# Patient Record
Sex: FEMALE | Race: WHITE | Employment: OTHER | URBAN - METROPOLITAN AREA
[De-identification: names, ages, dates, MRNs, and addresses within clinical notes are randomized per-mention and may not be internally consistent; named-entity substitution may affect disease eponyms.]

---

## 2022-09-29 ENCOUNTER — HOSPITAL ENCOUNTER (OUTPATIENT)
Age: 77
Discharge: HOME | End: 2022-09-29
Payer: MEDICARE

## 2022-09-29 ENCOUNTER — HOSPITAL ENCOUNTER (OUTPATIENT)
Dept: MRI IMAGING | Age: 77
Discharge: HOME | End: 2022-10-01
Payer: MEDICARE

## 2022-09-29 DIAGNOSIS — M54.32 SCIATICA, LEFT SIDE: ICD-10-CM

## 2022-09-29 PROCEDURE — 72148 MRI LUMBAR SPINE W/O DYE: CPT

## 2024-02-27 ENCOUNTER — OFFICE VISIT (OUTPATIENT)
Age: 79
End: 2024-02-27

## 2024-02-27 VITALS — WEIGHT: 180 LBS | BODY MASS INDEX: 27.28 KG/M2 | HEIGHT: 68 IN

## 2024-02-27 DIAGNOSIS — M72.0 DUPUYTREN'S DISEASE OF BOTH PALM AND FINGER WITHOUT CONTRACTURE: ICD-10-CM

## 2024-02-27 DIAGNOSIS — G56.01 CARPAL TUNNEL SYNDROME OF RIGHT WRIST: Primary | ICD-10-CM

## 2024-02-27 NOTE — PROGRESS NOTES
where she would need surgical intervention. Try to use her left hand more for gripping activities. Patient to follow up as needed.      Attending Physician Statement   I have seen and discussed the care of Asya Ramirez  including pertinent history and exam findings, with Cynthia Caba NP. I have reviewed the key elements of all parts of the encounter with the nurse practitioner at the time of the encounter. I either performed the key elements of the history and physical exam myself or was physically present while the nurse practitioner performed this. I agree with the assessment, plan and orders as documented by the nurse practitioner.     Pratik Julian MD   2/27/2024       Past History:  No current outpatient medications on file.  Allergies   Allergen Reactions    Dexamethasone Sod Phos-Lido Swelling    Levofloxacin Itching and Other (See Comments)     Other reaction(s): ITCH    Oxycodone-Acetaminophen Swelling    Benzonatate Other (See Comments)     Other reaction(s): Unknown    Calcitonin Other (See Comments)    Cefuroxime      Other Reaction(s): Upset stomach    Cefuroxime Axetil     Cephalexin     Ciprofloxacin Other (See Comments)     Other reaction(s): Unknown    Dexamethasone Swelling    Doxycycline Other (See Comments)     unknown    Methylprednisolone Swelling and Other (See Comments)    Methylprednisolone Acetate Swelling     DEPO MEDROL    Naproxen Itching and Other (See Comments)     Other reaction(s): Unknown    Acetaminophen-Codeine Nausea And Vomiting    Amoxicillin Other (See Comments)     Other Reaction(s): dizziness     dizzy    Hydrocodone Itching, Rash and Other (See Comments)     Social History     Socioeconomic History    Marital status:      Spouse name: Not on file    Number of children: Not on file    Years of education: Not on file    Highest education level: Not on file   Occupational History    Not on file   Tobacco Use    Smoking status: Never    Smokeless tobacco: Never

## 2025-06-24 ENCOUNTER — TRANSCRIBE ORDERS (OUTPATIENT)
Dept: ADMINISTRATIVE | Age: 80
End: 2025-06-24

## 2025-06-24 DIAGNOSIS — M16.12 UNILATERAL PRIMARY OSTEOARTHRITIS, LEFT HIP: Primary | ICD-10-CM

## 2025-07-15 ENCOUNTER — HOSPITAL ENCOUNTER (OUTPATIENT)
Dept: GENERAL RADIOLOGY | Age: 80
Discharge: HOME | End: 2025-07-17
Payer: MEDICARE

## 2025-07-15 ENCOUNTER — HOSPITAL ENCOUNTER (OUTPATIENT)
Age: 80
Discharge: HOME | End: 2025-07-17
Payer: MEDICARE

## 2025-07-15 DIAGNOSIS — S00.95XA: ICD-10-CM

## 2025-07-15 PROCEDURE — 70030 X-RAY EYE FOR FOREIGN BODY: CPT

## 2025-07-16 ENCOUNTER — HOSPITAL ENCOUNTER (OUTPATIENT)
Dept: MRI IMAGING | Age: 80
Discharge: HOME | End: 2025-07-18
Payer: MEDICARE

## 2025-07-16 DIAGNOSIS — M16.12 UNILATERAL PRIMARY OSTEOARTHRITIS, LEFT HIP: ICD-10-CM

## 2025-07-16 PROCEDURE — 73721 MRI JNT OF LWR EXTRE W/O DYE: CPT

## 2025-07-22 ENCOUNTER — OFFICE VISIT (OUTPATIENT)
Age: 80
End: 2025-07-22

## 2025-07-22 VITALS — HEIGHT: 68 IN | BODY MASS INDEX: 27.28 KG/M2 | WEIGHT: 180 LBS

## 2025-07-22 DIAGNOSIS — M16.12 ARTHRITIS OF LEFT HIP: Primary | ICD-10-CM

## 2025-07-22 NOTE — PROGRESS NOTES
[facility] & [facility]      [facility]  [facility]  [place] [address]  Dept: [phone]  Dept Fax: [phone]    Chief Compliant:  Chief Complaint   Patient presents with    Hip Pain     L Hip         History of Present Illness:  This is a 80 y.o. female who presents to the clinic today for evaluation / follow up of left hip pain.  She has been battling arthritis in this hip for over a year.  She did have an intra-articular injection in the past which she did get good relief with.  However the pain has returned and it is more significant than ever.  It does make her feel like a fall risk as when it gives her the sharp jolts of pain.  It is significantly affecting her ability to move and perform activities of daily living..       Physical Exam:    On examination the left hip has pain to range of motion and crepitus.  She has loss of internal rotation and pain reproduced with this maneuver.  She is decreased hip flexion strength secondary to pain that is reproduced with it.  No hip instability she does walk with antalgic gait.    Nursing note and vitals reviewed.     Labs and Imaging: X-rays of the left hip do show that she has arthritis in his left hip with osteophytes present loss of joint space.    XR taken today:  No results found.      No orders of the defined types were placed in this encounter.      Assessment and Plan:  No diagnosis found.      This is a 80 y.o. female with left hip arthritis.  At this point we discussed treatment options including another steroid injection and continued activity modification.  However she feels like it is a significantly affecting her gait pattern and stability and lifestyle.  At this point we discussed left anterior total hip replacement.  She would like to go forward with this.  The risks of surgery including blood clots infection hip instability,

## 2025-07-23 PROBLEM — M16.12 OSTEOARTHRITIS OF LEFT HIP: Status: ACTIVE | Noted: 2025-07-23

## 2025-08-04 ENCOUNTER — TELEPHONE (OUTPATIENT)
Age: 80
End: 2025-08-04

## 2025-08-25 ENCOUNTER — TELEPHONE (OUTPATIENT)
Age: 80
End: 2025-08-25

## 2025-08-25 DIAGNOSIS — Z01.818 PRE-OP EXAM: Primary | ICD-10-CM

## 2025-09-02 ENCOUNTER — HOSPITAL ENCOUNTER (OUTPATIENT)
Age: 80
Setting detail: SPECIMEN
Discharge: HOME | End: 2025-09-02

## 2025-09-02 ENCOUNTER — HOSPITAL ENCOUNTER (OUTPATIENT)
Age: 80
Discharge: HOME | End: 2025-09-06
Payer: MEDICARE

## 2025-09-02 VITALS
RESPIRATION RATE: 20 BRPM | DIASTOLIC BLOOD PRESSURE: 88 MMHG | WEIGHT: 170 LBS | BODY MASS INDEX: 25.76 KG/M2 | HEIGHT: 68 IN | OXYGEN SATURATION: 98 % | HEART RATE: 82 BPM | SYSTOLIC BLOOD PRESSURE: 134 MMHG | TEMPERATURE: 98.2 F

## 2025-09-02 DIAGNOSIS — Z01.818 PRE-OP EXAM: ICD-10-CM

## 2025-09-02 DIAGNOSIS — Z01.818 PRE-OP TESTING: Primary | ICD-10-CM

## 2025-09-02 LAB
ANION GAP SERPL CALCULATED.3IONS-SCNC: 12 MMOL/L (ref 9–16)
BASOPHILS # BLD: 0.03 K/UL (ref 0–0.2)
BASOPHILS NFR BLD: 1 % (ref 0–2)
BUN SERPL-MCNC: 17 MG/DL (ref 8–23)
CALCIUM SERPL-MCNC: 10.1 MG/DL (ref 8.6–10.4)
CHLORIDE SERPL-SCNC: 101 MMOL/L (ref 98–107)
CO2 SERPL-SCNC: 29 MMOL/L (ref 20–31)
CREAT SERPL-MCNC: 0.7 MG/DL (ref 0.6–0.9)
EKG ATRIAL RATE: 56 BPM
EKG P AXIS: 84 DEGREES
EKG P-R INTERVAL: 166 MS
EKG Q-T INTERVAL: 424 MS
EKG QRS DURATION: 90 MS
EKG QTC CALCULATION (BAZETT): 409 MS
EKG R AXIS: 20 DEGREES
EKG T AXIS: 106 DEGREES
EKG VENTRICULAR RATE: 56 BPM
EOSINOPHIL # BLD: 0.09 K/UL (ref 0–0.44)
EOSINOPHILS RELATIVE PERCENT: 2 % (ref 1–4)
ERYTHROCYTE [DISTWIDTH] IN BLOOD BY AUTOMATED COUNT: 12.6 % (ref 11.8–14.4)
GFR, ESTIMATED: 88 ML/MIN/1.73M2
GLUCOSE SERPL-MCNC: 90 MG/DL (ref 74–99)
HCT VFR BLD AUTO: 43 % (ref 36.3–47.1)
HGB BLD-MCNC: 14.2 G/DL (ref 11.9–15.1)
IMM GRANULOCYTES # BLD AUTO: <0.03 K/UL (ref 0–0.3)
IMM GRANULOCYTES NFR BLD: 0 %
LYMPHOCYTES NFR BLD: 1.98 K/UL (ref 1.1–3.7)
LYMPHOCYTES RELATIVE PERCENT: 35 % (ref 24–43)
MCH RBC QN AUTO: 29.5 PG (ref 25.2–33.5)
MCHC RBC AUTO-ENTMCNC: 33 G/DL (ref 28.4–34.8)
MCV RBC AUTO: 89.4 FL (ref 82.6–102.9)
MONOCYTES NFR BLD: 0.58 K/UL (ref 0.1–1.2)
MONOCYTES NFR BLD: 10 % (ref 3–12)
NEUTROPHILS NFR BLD: 52 % (ref 36–65)
NEUTS SEG NFR BLD: 3.04 K/UL (ref 1.5–8.1)
NRBC BLD-RTO: 0 PER 100 WBC
PLATELET # BLD AUTO: 278 K/UL (ref 138–453)
PMV BLD AUTO: 10.6 FL (ref 8.1–13.5)
POTASSIUM SERPL-SCNC: 3.9 MMOL/L (ref 3.7–5.3)
RBC # BLD AUTO: 4.81 M/UL (ref 3.95–5.11)
SODIUM SERPL-SCNC: 142 MMOL/L (ref 136–145)
WBC OTHER # BLD: 5.7 K/UL (ref 3.5–11.3)

## 2025-09-02 PROCEDURE — 86901 BLOOD TYPING SEROLOGIC RH(D): CPT

## 2025-09-02 PROCEDURE — 93010 ELECTROCARDIOGRAM REPORT: CPT | Performed by: INTERNAL MEDICINE

## 2025-09-02 PROCEDURE — 93005 ELECTROCARDIOGRAM TRACING: CPT

## 2025-09-02 PROCEDURE — 85025 COMPLETE CBC W/AUTO DIFF WBC: CPT

## 2025-09-02 PROCEDURE — 86850 RBC ANTIBODY SCREEN: CPT

## 2025-09-02 PROCEDURE — 36415 COLL VENOUS BLD VENIPUNCTURE: CPT

## 2025-09-02 PROCEDURE — 86900 BLOOD TYPING SEROLOGIC ABO: CPT

## 2025-09-02 PROCEDURE — 80048 BASIC METABOLIC PNL TOTAL CA: CPT

## 2025-09-02 RX ORDER — M-VIT,TX,IRON,MINS/CALC/FOLIC 27MG-0.4MG
1 TABLET ORAL DAILY
COMMUNITY

## 2025-09-02 RX ORDER — TIZANIDINE 2 MG/1
2 TABLET ORAL EVERY 8 HOURS PRN
COMMUNITY

## 2025-09-02 ASSESSMENT — PAIN DESCRIPTION - LOCATION: LOCATION: HIP

## 2025-09-02 ASSESSMENT — PAIN DESCRIPTION - ORIENTATION: ORIENTATION: LEFT

## 2025-09-02 ASSESSMENT — PAIN SCALES - GENERAL: PAINLEVEL_OUTOF10: 1

## 2025-09-03 ENCOUNTER — CASE MANAGEMENT (OUTPATIENT)
Dept: CASE MANAGEMENT | Age: 80
End: 2025-09-03

## 2025-09-03 ENCOUNTER — CASE MANAGEMENT (OUTPATIENT)
Age: 80
End: 2025-09-03

## 2025-09-03 DIAGNOSIS — M16.12 OSTEOARTHRITIS OF LEFT HIP, UNSPECIFIED OSTEOARTHRITIS TYPE: Primary | ICD-10-CM

## 2025-09-03 LAB
ABO + RH BLD: NORMAL
ARM BAND NUMBER: NORMAL
BLOOD BANK SAMPLE EXPIRATION: NORMAL
BLOOD GROUP ANTIBODIES SERPL: NEGATIVE
MRSA, DNA, NASAL: NEGATIVE
SPECIMEN DESCRIPTION: NORMAL